# Patient Record
Sex: MALE | Race: WHITE
[De-identification: names, ages, dates, MRNs, and addresses within clinical notes are randomized per-mention and may not be internally consistent; named-entity substitution may affect disease eponyms.]

---

## 2018-01-16 ENCOUNTER — HOSPITAL ENCOUNTER (EMERGENCY)
Dept: HOSPITAL 45 - C.EDB | Age: 23
Discharge: HOME | End: 2018-01-16
Payer: COMMERCIAL

## 2018-01-16 VITALS
BODY MASS INDEX: 22.78 KG/M2 | HEIGHT: 70.98 IN | WEIGHT: 162.7 LBS | HEIGHT: 70.98 IN | BODY MASS INDEX: 22.78 KG/M2 | WEIGHT: 162.7 LBS

## 2018-01-16 VITALS — HEART RATE: 88 BPM | DIASTOLIC BLOOD PRESSURE: 79 MMHG | SYSTOLIC BLOOD PRESSURE: 127 MMHG | OXYGEN SATURATION: 97 %

## 2018-01-16 VITALS — TEMPERATURE: 97.88 F
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
## 2018-01-16 DIAGNOSIS — T81.30XA: Primary | ICD-10-CM

## 2018-01-16 NOTE — EMERGENCY ROOM VISIT NOTE
History


Report prepared by Osmin:  Zan Cat


Under the Supervision of:  Dr. Natan Brenner M.D.


First contact with patient:  06:53


Chief Complaint:  WOUND RECHECK


Stated Complaint:  RIPPED STITCHES IN LOWER LBACK





History of Present Illness


The patient is a 22 year old male who presents to the Emergency Room with 

complaints of an open wound to his lower back that occurred this morning. He 

has a past medical history of a cyst removal surgery that occurred 10 days ago. 

He removed his stitches himself three days ago, having them in for 7 days. He 

states that his surgeon told him that they would be ready after 1 week, and at 

that time, one of the stitches was already coming out, so he removed them 

himself. While the patient was at PT this morning working out, his wound split 

open. He denies any other abnormal symptoms at this time.





   Source of History:  patient


   Onset:  this morning


   Position:  back (lower)


   Symptom Intensity:  moderate


   Quality:  other (Open wound)


   Timing:  constant


Note:


He denies any other abnormal symptoms at this time.





Review of Systems


See HPI for pertinent positives & negatives. A total of 10 systems reviewed and 

were otherwise negative.





Past Medical & Surgical


Surgical Problems:


(1) Hx of removal of cyst








Family History





Patient reports no known family medical history.





Social History


Smoking Status:  Never Smoker


Smokeless Tobacco Use:  No


Drug Use:  none


Marital Status:  single


Occupation Status:  student





Current/Historical Medications


No Active Prescriptions or Reported Meds





Allergies


Coded Allergies:  


     No Known Allergies (Unverified , 1/16/18)





Physical Exam


Vital Signs











  Date Time  Temp Pulse Resp B/P (MAP) Pulse Ox O2 Delivery O2 Flow Rate FiO2


 


1/16/18 08:13  88 20 127/79 97   


 


1/16/18 06:49 36.6 108 18 133/82 96 Room Air  











Physical Exam


GENERAL: Patient is a healthy-appearing well-nourished male


HEAD: Normocephalic atraumatic


EYES: Ocular movements intact pupils equal and react to light


OROPHARYNX mucous membranes are moist no exudates present no erythema or edema 

present


NECK: Supple no nuchal rigidity


CHEST: Good equal expansion


LUNGS: Clear and equal to auscultation


CARDIAC: Normal S1 and S2


ABDOMEN: Soft nontender no guarding


BACK: No CVA tenderness. There is a 3 cm surgical site that is wide open. It is 

deep. 


EXTREMITIES: No pain upon palpation normal muscle strength in all groups no 

clubbing cyanosis or edema


NEURO: Patient is following commands and answering questions appropriately. 

Alert and oriented x3 Cranial Nerves 2-12 grossly intact





Medical Decision & Procedures


ED Course


0653: Past medical records reviewed. The patient was evaluated in room B9. A 

complete history and physical examination was performed. 





0700: I spoke with Shaista Oakley PA-C of [facility] at this time. We discussed the 

patient's case. She will be into the ER to evaluate the patient and return to 

me with recommendations. 





0745: Shaista Oakley PA-C recommended that the patient's wound be packed and 

replaced every other day in her office. 





0810: Upon reexamination the patient is resting. I discussed results and 

treatment plan with the patient. He verbalizes agreement and understanding. The 

patient is ready for discharge.





Medical Decision


This is a 22-year-old male who presents emergency department with a wound to 

his hands.  He had a cyst removed back in January 5 and [facility].  The 

patient then took the sutures out himself one week later.  While doing physical 

therapy today, the patient ripped open the sutures in his back.  He has a very 

deep wound.  The surgical PA was kind enough to come down and see the patient 

and she recommended packing with follow-up in the clinic this week.  The 

patient was written for a note to get him on a physical therapy until follow-up 

with the surgeon.  Patient was in agreement with the treatment plan.





Medication Reconcilliation


Current Medication List:  was personally reviewed by me





Blood Pressure Screening


Patient's blood pressure:  Elevated blood pressure


Blood pressure disposition:  Elevated BP felt to be situational





Consults


Time Called:  0650


Consulting Physician:  Shaista LUEVANO


Returned Call:  0700


We discussed the patient's case. They will come evaluate the patient in the ER 

and return to me for her recommendations.


Additional Consults:  


   Time Called:  0745


   Consulted Physician:  Shaista LUEVANO


   Returned Call:  0745


Additional Comments:


She recommended packing and the wound every other day in their office.





Impression





 Primary Impression:  


 Wound dehiscence





Scribe Attestation


The scribe's documentation has been prepared under my direction and personally 

reviewed by me in its entirety. I confirm that the note above accurately 

reflects all work, treatment, procedures, and medical decision making performed 

by me.





Departure Information


Dispostion


Home / Self-Care





Prescriptions





No Active Prescriptions or Reported Meds





Referrals


No Doctor, Assigned (PCP)





Forms


HOME CARE DOCUMENTATION FORM,                                                 

               IMPORTANT VISIT INFORMATION, WORK / SCHOOL INSTRUCTIONS





Patient Instructions


My [facility]





Additional Instructions





Follow up with Ms Oakley's office





Change packing every other day











You have been examined and treated today on an emergency basis only. This is 

not a substitute for, or an effort to provide, complete comprehensive medical 

care. It is impossible to recognize and treat all injuries or illnesses in a 

single emergency department visit. It is therefore important that you follow up 

closely with [facility].  Call as soon as possible for an 

appointment.  





Thank you for your time and consideration.  I look forward to speaking with you 

again soon.  Please don't hesitate to call us if you have any questions.